# Patient Record
Sex: MALE | Race: WHITE | Employment: OTHER | URBAN - METROPOLITAN AREA
[De-identification: names, ages, dates, MRNs, and addresses within clinical notes are randomized per-mention and may not be internally consistent; named-entity substitution may affect disease eponyms.]

---

## 2019-01-01 ENCOUNTER — OFFICE VISIT (OUTPATIENT)
Dept: CARDIOLOGY CLINIC | Age: 64
End: 2019-01-01

## 2019-01-01 VITALS
HEART RATE: 67 BPM | BODY MASS INDEX: 30.61 KG/M2 | HEIGHT: 67 IN | DIASTOLIC BLOOD PRESSURE: 74 MMHG | WEIGHT: 195 LBS | SYSTOLIC BLOOD PRESSURE: 130 MMHG

## 2019-01-01 VITALS
BODY MASS INDEX: 30.45 KG/M2 | DIASTOLIC BLOOD PRESSURE: 93 MMHG | SYSTOLIC BLOOD PRESSURE: 176 MMHG | WEIGHT: 194 LBS | HEART RATE: 66 BPM | HEIGHT: 67 IN

## 2019-01-01 DIAGNOSIS — Z95.5 HISTORY OF CORONARY ARTERY STENT PLACEMENT: ICD-10-CM

## 2019-01-01 DIAGNOSIS — I25.118 CORONARY ARTERY DISEASE OF NATIVE ARTERY OF NATIVE HEART WITH STABLE ANGINA PECTORIS (HCC): Primary | ICD-10-CM

## 2019-01-01 DIAGNOSIS — R07.9 CHEST PAIN, UNSPECIFIED TYPE: Primary | ICD-10-CM

## 2019-01-01 DIAGNOSIS — I25.118 CORONARY ARTERY DISEASE OF NATIVE ARTERY OF NATIVE HEART WITH STABLE ANGINA PECTORIS (HCC): ICD-10-CM

## 2019-01-01 DIAGNOSIS — E78.00 HYPERCHOLESTEREMIA: ICD-10-CM

## 2019-01-01 DIAGNOSIS — I10 ESSENTIAL HYPERTENSION: ICD-10-CM

## 2019-01-01 DIAGNOSIS — R07.9 CHEST PAIN, UNSPECIFIED TYPE: ICD-10-CM

## 2019-01-01 LAB — LDL-C, EXTERNAL: 93

## 2019-01-01 RX ORDER — ATORVASTATIN CALCIUM 80 MG/1
80 TABLET, FILM COATED ORAL DAILY
Qty: 90 TAB | Refills: 1 | Status: SHIPPED | OUTPATIENT
Start: 2019-01-01 | End: 2019-01-01 | Stop reason: SDUPTHER

## 2019-01-01 RX ORDER — METOPROLOL SUCCINATE 100 MG/1
100 TABLET, EXTENDED RELEASE ORAL DAILY
Qty: 90 TAB | Refills: 3 | Status: SHIPPED | OUTPATIENT
Start: 2019-01-01

## 2019-01-01 RX ORDER — AMLODIPINE BESYLATE 5 MG/1
5 TABLET ORAL DAILY
Qty: 30 TAB | Refills: 3 | Status: SHIPPED | OUTPATIENT
Start: 2019-01-01 | End: 2019-01-01 | Stop reason: SDUPTHER

## 2019-01-01 RX ORDER — ATORVASTATIN CALCIUM 80 MG/1
80 TABLET, FILM COATED ORAL DAILY
Qty: 90 TAB | Refills: 3 | Status: SHIPPED | OUTPATIENT
Start: 2019-01-01

## 2019-01-01 RX ORDER — METOPROLOL SUCCINATE 100 MG/1
100 TABLET, EXTENDED RELEASE ORAL DAILY
COMMUNITY
End: 2019-01-01 | Stop reason: SDUPTHER

## 2019-01-01 RX ORDER — NITROGLYCERIN 0.4 MG/1
0.4 TABLET SUBLINGUAL
Qty: 25 TAB | Refills: 1 | Status: SHIPPED | OUTPATIENT
Start: 2019-01-01

## 2019-01-01 RX ORDER — LEVOTHYROXINE SODIUM 150 UG/1
150 TABLET ORAL
Qty: 90 TAB | Refills: 3 | OUTPATIENT
Start: 2019-01-01

## 2019-01-01 RX ORDER — AMLODIPINE BESYLATE 5 MG/1
5 TABLET ORAL DAILY
Qty: 90 TAB | Refills: 3 | Status: SHIPPED | OUTPATIENT
Start: 2019-01-01

## 2019-03-08 PROBLEM — I10 ESSENTIAL HYPERTENSION: Status: ACTIVE | Noted: 2019-01-01

## 2019-03-08 NOTE — PROGRESS NOTES
HISTORY OF PRESENT ILLNESS  Iona Escobar is a 75 y.o. male. 4/15 Patient denies significant chest pain, SOB, palpitations, edema, dizziness        Cholesterol Problem   The history is provided by the medical records. This is a chronic problem. Associated symptoms include chest pain and shortness of breath. Pertinent negatives include no headaches. Chest Pain (Angina)    The history is provided by the patient. This is a new problem. The current episode started more than 1 week ago (11/18). The pain is associated with exertion (hunting). The pain is present in the substernal region. The quality of the pain is described as pressure-like. The pain does not radiate. Associated symptoms include shortness of breath. Pertinent negatives include no claudication, no cough, no diaphoresis, no dizziness, no fever, no headaches, no malaise/fatigue, no nausea, no orthopnea, no palpitations, no PND and no vomiting. Review of Systems   Constitutional: Negative for chills, diaphoresis, fever, malaise/fatigue and weight loss. HENT: Negative for nosebleeds. Eyes: Negative for discharge. Respiratory: Positive for shortness of breath. Negative for cough and wheezing. Cardiovascular: Positive for chest pain. Negative for palpitations, orthopnea, claudication, leg swelling and PND. Gastrointestinal: Negative for diarrhea, nausea and vomiting. Genitourinary: Negative for dysuria and hematuria. Musculoskeletal: Negative for joint pain. Skin: Negative for rash. Neurological: Negative for dizziness, seizures, loss of consciousness and headaches. Endo/Heme/Allergies: Negative for polydipsia. Does not bruise/bleed easily. Psychiatric/Behavioral: Negative for depression and substance abuse. The patient does not have insomnia.       No Known Allergies    Past Medical History:   Diagnosis Date    CAD (coronary artery disease)     Coronary atherosclerosis of native coronary artery     Myocardial infarction ([facility] [address]     Other and unspecified angina pectoris     improved    Other and unspecified hyperlipidemia     Postsurgical percutaneous transluminal coronary angioplasty status     Thyroid disease        Family History   Problem Relation Age of Onset    Heart Disease Father         CAD    Coronary Artery Disease Father     Heart Attack Father 54    Heart Surgery Brother 62    Stroke Neg Hx        Social History     Tobacco Use    Smoking status: Never Smoker    Smokeless tobacco: Never Used    Tobacco comment: extensive exposure to second hand smoke   Substance Use Topics    Alcohol use: Yes     Alcohol/week: 2.4 - 3.0 oz     Types: 3 - 4 Standard drinks or equivalent, 1 Cans of beer per week     Comment: occasional     Drug use: No        Current Outpatient Medications   Medication Sig    metoprolol (LOPRESSOR) 100 mg tablet TAKE 1 TABLET BY MOUTH DAILY    atorvastatin (LIPITOR) 40 mg tablet TAKE ONE TABLET BY MOUTH EVERY DAY    levothyroxine (SYNTHROID) 150 mcg tablet Take  by mouth Daily (before breakfast).  DOCOSAHEXANOIC ACID/EPA (FISH OIL PO) Take  by mouth.  nitroglycerin (NITROSTAT) 0.4 mg SL tablet 1 Tab by SubLINGual route every five (5) minutes as needed for Chest Pain.  aspirin 81 mg tablet Take 81 mg by mouth daily.  clopidogrel (PLAVIX) 75 mg tablet Take 1 Tab by mouth daily.  fenofibrate nanocrystallized (TRICOR) 145 mg tablet Take 1 Tab by mouth daily. No current facility-administered medications for this visit.          Past Surgical History:   Procedure Laterality Date    HX ADENOIDECTOMY      HX CORONARY STENT PLACEMENT      HX HEART CATHETERIZATION      HX TONSILLECTOMY         Diagnostic Studies:  CARDIOLOGY STUDIES 8/8/2014 6/19/2014 4/7/2014   Cardiac Cath with PCI Result - LINO prox RCA -   Treadmill Stress Test Result WNL - -   Pharmacological Nuclear Stress Test Result - - nl scan, nl EF   Echocardiogram - Complete Result - 55-60%EF, tr MR -   Some recent data might be hidden       Visit Vitals  BP (!) 176/93 (BP 1 Location: Right arm, BP Patient Position: Sitting)   Pulse 66   Ht 5' 7\" (1.702 m)   Wt 88 kg (194 lb)   BMI 30.38 kg/m²       Mr. Gill Raman has a reminder for a \"due or due soon\" health maintenance. I have asked that he contact his primary care provider for follow-up on this health maintenance. Physical Exam   Constitutional: He is oriented to person, place, and time. He appears well-developed and well-nourished. No distress. HENT:   Head: Normocephalic and atraumatic. Mouth/Throat: Normal dentition. Eyes: Right eye exhibits no discharge. Left eye exhibits no discharge. No scleral icterus. Neck: Neck supple. No JVD present. Carotid bruit is not present. No thyromegaly present. Cardiovascular: Normal rate, regular rhythm, S1 normal, S2 normal, normal heart sounds and intact distal pulses. Exam reveals no gallop and no friction rub. No murmur heard. Pulmonary/Chest: Effort normal and breath sounds normal. He has no wheezes. He has no rales. Abdominal: Soft. He exhibits no mass. There is no tenderness. Musculoskeletal: He exhibits no edema. Lymphadenopathy:        Right cervical: No superficial cervical adenopathy present. Left cervical: No superficial cervical adenopathy present. Neurological: He is alert and oriented to person, place, and time. Skin: Skin is warm and dry. No rash noted. Psychiatric: He has a normal mood and affect. His behavior is normal.       ASSESSMENT and PLAN      HLD: 1/19 , LDL 93, HDL 36,     History of coronary stent: 6/14 proximal RCA        Diagnoses and all orders for this visit:    1. Coronary artery disease of native artery of native heart with stable angina pectoris (HCC)  -     amLODIPine (NORVASC) 5 mg tablet; Take 1 Tab by mouth daily.   -     nitroglycerin (NITROSTAT) 0.4 mg SL tablet; 1 Tab by SubLINGual route every five (5) minutes as needed for Chest Pain for up to 3 doses.  -     NUCLEAR CARDIAC STRESS TEST; Future    2. Chest pain, unspecified type  -     AMB POC EKG ROUTINE W/ 12 LEADS, INTER & REP  -     NUCLEAR CARDIAC STRESS TEST; Future    3. Hypercholesteremia  -     atorvastatin (LIPITOR) 80 mg tablet; Take 1 Tab by mouth daily. 4. Essential hypertension    5. History of coronary artery stent placement  -     NUCLEAR CARDIAC STRESS TEST; Future        Pertinent laboratory and test data reviewed and discussed with patient.   See patient instructions also for other medical advice given    Medications Discontinued During This Encounter   Medication Reason    fenofibrate nanocrystallized (TRICOR) 145 mg tablet     clopidogrel (PLAVIX) 75 mg tablet     metoprolol (LOPRESSOR) 100 mg tablet Alternate Therapy    atorvastatin (LIPITOR) 40 mg tablet     nitroglycerin (NITROSTAT) 0.4 mg SL tablet Reorder       Follow-up Disposition:  Return in about 2 weeks (around 3/22/2019), or if symptoms worsen or fail to improve, for same day post test.

## 2019-03-08 NOTE — PATIENT INSTRUCTIONS
Medications Discontinued During This Encounter   Medication Reason    fenofibrate nanocrystallized (TRICOR) 145 mg tablet     clopidogrel (PLAVIX) 75 mg tablet     metoprolol (LOPRESSOR) 100 mg tablet Alternate Therapy    atorvastatin (LIPITOR) 40 mg tablet     nitroglycerin (NITROSTAT) 0.4 mg SL tablet Reorder   After the recommended changes have been made in blood pressure medicines, patient advised to keep BP/HR(pulse rate) chart twice daily and bring us results in next office visit. Patient may send the results via \"My Chart\" if desired. Please rest for 5-10 minutes before checking blood pressure         Learning About Coronary Artery Disease (CAD)  What is coronary artery disease? Coronary artery disease (CAD) occurs when plaque builds up in the arteries that bring oxygen-rich blood to your heart. Plaque is a fatty substance made of cholesterol, calcium, and other substances in the blood. This process is called hardening of the arteries, or atherosclerosis. What happens when you have coronary artery disease? · Plaque may narrow the coronary arteries. Narrowed arteries cause poor blood flow. This can lead to angina symptoms such as chest pain or discomfort. If blood flow is completely blocked, you could have a heart attack. · You can slow CAD and reduce the risk of future problems by making changes in your lifestyle. These include quitting smoking and eating heart-healthy foods. · Treatments for CAD, along with changes in your lifestyle, can help you live a longer and healthier life. How can you prevent coronary artery disease? · Do not smoke. It may be the best thing you can do to prevent heart disease. If you need help quitting, talk to your doctor about stop-smoking programs and medicines. These can increase your chances of quitting for good. · Be active. Get at least 30 minutes of exercise on most days of the week. Walking is a good choice.  You also may want to do other activities, such as running, swimming, cycling, or playing tennis or team sports. · Eat heart-healthy foods. Eat more fruits and vegetables and less foods that contain saturated and trans fats. Limit alcohol, sodium, and sweets. · Stay at a healthy weight. Lose weight if you need to. · Manage other health problems such as diabetes, high blood pressure, and high cholesterol. · Manage stress. Stress can hurt your heart. To keep stress low, talk about your problems and feelings. Don't keep your feelings hidden. · If you have talked about it with your doctor, take a low-dose aspirin every day. Aspirin can help certain people lower their risk of a heart attack or stroke. But taking aspirin isn't right for everyone, because it can cause serious bleeding. Do not start taking daily aspirin unless your doctor knows about it. How is coronary artery disease treated? · Your doctor will suggest that you make lifestyle changes. For example, your doctor may ask you to eat healthy foods, quit smoking, lose extra weight, and be more active. · You will have to take medicines. · Your doctor may suggest a procedure to open narrowed or blocked arteries. This is called angioplasty. Or your doctor may suggest using healthy blood vessels to create detours around narrowed or blocked arteries. This is called bypass surgery. Follow-up care is a key part of your treatment and safety. Be sure to make and go to all appointments, and call your doctor if you are having problems. It's also a good idea to know your test results and keep a list of the medicines you take. Where can you learn more? Go to http://luca-randy.info/. Enter [phone] in the search box to learn more about \"Learning About Coronary Artery Disease (CAD). \"  Current as of: July 22, 2018  Content Version: 11.9  © 7138-7122 [employer], [employer].  Care instructions adapted under license by [employer] (which disclaims liability or warranty for this information). If you have questions about a medical condition or this instruction, always ask your healthcare professional. [facility] any warranty or liability for your use of this information. "[employer] Activation    Thank you for requesting access to "[employer]. Please follow the instructions below to securely access and download your online medical record. "[employer] allows you to send messages to your doctor, view your test results, renew your prescriptions, schedule appointments, and more. How Do I Sign Up? 1. In your internet browser, go to [email or web address]. 2. Click on the First Time User? Click Here link in the Sign In box. You will see the New Member Sign Up page. 3. Enter your "[employer] Access Code exactly as it appears below. You will not need to use this code after youve completed the sign-up process. If you do not sign up before the expiration date, you must request a new code. "[employer] Access Code: [email or web address]  Expires: 2019  1:08 PM (This is the date your "[employer] access code will )    4. Enter the last four digits of your Social Security Number (xxxx) and Date of Birth (mm/dd/yyyy) as indicated and click Submit. You will be taken to the next sign-up page. 5. Create a "[employer] ID. This will be your "[employer] login ID and cannot be changed, so think of one that is secure and easy to remember. 6. Create a "[employer] password. You can change your password at any time. 7. Enter your Password Reset Question and Answer. This can be used at a later time if you forget your password. 8. Enter your e-mail address. You will receive e-mail notification when new information is available in [address] 9. Click Sign Up. You can now view and download portions of your medical record. 10. Click the Download Summary menu link to download a portable copy of your medical information.     Additional Information    If you have questions, please visit the Frequently Asked Questions section of the [employer] website at [email or web address] Remember, [employer] is NOT to be used for urgent needs. For medical emergencies, dial 911.

## 2019-03-08 NOTE — PROGRESS NOTES
1. Have you been to the ER, urgent care clinic since your last visit? Hospitalized since your last visit? No    2. Have you seen or consulted any other health care providers outside of the [address] [place] [address] since your last visit? Include any pap smears or colon screening. PCP      3. Since your last visit, have you had any of the following symptoms? chest pains, palpitations and shortness of breath with exertion. 4.  Have you had any blood work, X-rays or cardiac testing? Blood work faxed from PCP    5. Where do you normally have your labs drawn? PCP/ [facility]    6. Do you need any refills today?    No

## 2019-03-08 NOTE — LETTER
Ernestine Arias 1955 
 
3/8/2019 Dear Thuan Hollis MD 
 
I had the pleasure of evaluating  Mr. Adwoa Stewart in office today. Below are the relevant portions of my assessment and plan of care. ICD-10-CM ICD-9-CM 1. Coronary artery disease of native artery of native heart with stable angina pectoris (HCC) I25.118 414.01 metoprolol succinate (TOPROL-XL) 100 mg tablet 413.9 amLODIPine (NORVASC) 5 mg tablet  
   nitroglycerin (NITROSTAT) 0.4 mg SL tablet NUCLEAR CARDIAC STRESS TEST 2. Chest pain, unspecified type R07.9 786.50 AMB POC EKG ROUTINE W/ 12 LEADS, INTER & REP  
   NUCLEAR CARDIAC STRESS TEST 3. Hypercholesteremia E78.00 272.0 atorvastatin (LIPITOR) 80 mg tablet 4. Essential hypertension I10 401.9 5. History of coronary artery stent placement Z95.5 V45.82 NUCLEAR CARDIAC STRESS TEST Current Outpatient Medications Medication Sig Dispense Refill  metoprolol succinate (TOPROL-XL) 100 mg tablet Take 100 mg by mouth daily.  amLODIPine (NORVASC) 5 mg tablet Take 1 Tab by mouth daily. 30 Tab 3  
 atorvastatin (LIPITOR) 80 mg tablet Take 1 Tab by mouth daily. 90 Tab 1  
 nitroglycerin (NITROSTAT) 0.4 mg SL tablet 1 Tab by SubLINGual route every five (5) minutes as needed for Chest Pain for up to 3 doses. 25 Tab 1  
 levothyroxine (SYNTHROID) 150 mcg tablet Take  by mouth Daily (before breakfast).  DOCOSAHEXANOIC ACID/EPA (FISH OIL PO) Take  by mouth.  aspirin 81 mg tablet Take 81 mg by mouth daily. Orders Placed This Encounter  AMB POC EKG ROUTINE W/ 12 LEADS, INTER & REP Order Specific Question:   Reason for Exam: Answer:   follow up  metoprolol succinate (TOPROL-XL) 100 mg tablet Sig: Take 100 mg by mouth daily.  amLODIPine (NORVASC) 5 mg tablet Sig: Take 1 Tab by mouth daily. Dispense:  30 Tab Refill:  3  
 atorvastatin (LIPITOR) 80 mg tablet Sig: Take 1 Tab by mouth daily. Dispense:  90 Tab Refill:  1  
 nitroglycerin (NITROSTAT) 0.4 mg SL tablet Si Tab by SubLINGual route every five (5) minutes as needed for Chest Pain for up to 3 doses. Dispense:  25 Tab Refill:  1 If you have questions, please do not hesitate to call me. I look forward to following Mr. Lieutenant Razo along with you. Sincerely, Donte Zhang MD

## 2019-04-15 PROBLEM — Z95.5 HISTORY OF CORONARY ARTERY STENT PLACEMENT: Status: ACTIVE | Noted: 2019-01-01

## 2019-04-15 NOTE — PROGRESS NOTES
HISTORY OF PRESENT ILLNESS  [place] is a 75 y.o. male. 4/15 Patient denies significant chest pain, SOB, palpitations, edema, dizziness    Found to have mild asbestosis by a pulmonary specialist in 2016/17 approximately. Cholesterol Problem   The history is provided by the medical records. This is a chronic problem. Associated symptoms include chest pain and shortness of breath. Pertinent negatives include no headaches. Chest Pain (Angina)    The history is provided by the patient. This is a new problem. The current episode started more than 1 week ago (11/18). The problem has been rapidly improving (But has not done any heavy activity since last visit). The pain is associated with exertion (hunting). The pain is present in the substernal region. The quality of the pain is described as pressure-like. The pain does not radiate. Associated symptoms include shortness of breath. Pertinent negatives include no claudication, no cough, no diaphoresis, no dizziness, no fever, no headaches, no malaise/fatigue, no nausea, no orthopnea, no palpitations, no PND and no vomiting. Hypertension   The history is provided by the medical records. This is a chronic problem. Associated symptoms include chest pain and shortness of breath. Pertinent negatives include no headaches. Review of Systems   Constitutional: Negative for chills, diaphoresis, fever, malaise/fatigue and weight loss. HENT: Negative for nosebleeds. Eyes: Negative for discharge. Respiratory: Positive for shortness of breath. Negative for cough and wheezing. Cardiovascular: Positive for chest pain. Negative for palpitations, orthopnea, claudication, leg swelling and PND. Gastrointestinal: Negative for diarrhea, nausea and vomiting. Genitourinary: Negative for dysuria and hematuria. Musculoskeletal: Negative for joint pain. Skin: Negative for rash.    Neurological: Negative for dizziness, seizures, loss of consciousness and headaches. Endo/Heme/Allergies: Negative for polydipsia. Does not bruise/bleed easily. Psychiatric/Behavioral: Negative for depression and substance abuse. The patient does not have insomnia. No Known Allergies    Past Medical History:   Diagnosis Date    CAD (coronary artery disease)     Coronary atherosclerosis of native coronary artery     Essential hypertension 3/8/2019    Myocardial infarction ([facility] [address]     Other and unspecified angina pectoris     improved    Other and unspecified hyperlipidemia     Postsurgical percutaneous transluminal coronary angioplasty status     Thyroid disease        Family History   Problem Relation Age of Onset    Heart Disease Father         CAD    Coronary Artery Disease Father     Heart Attack Father 54    Heart Surgery Brother 62    Stroke Neg Hx        Social History     Tobacco Use    Smoking status: Never Smoker    Smokeless tobacco: Never Used    Tobacco comment: extensive exposure to second hand smoke   Substance Use Topics    Alcohol use: Yes     Alcohol/week: 2.4 - 3.0 oz     Types: 3 - 4 Standard drinks or equivalent, 1 Cans of beer per week     Comment: occasional     Drug use: No        Current Outpatient Medications   Medication Sig    metoprolol succinate (TOPROL-XL) 100 mg tablet Take 100 mg by mouth daily.  amLODIPine (NORVASC) 5 mg tablet Take 1 Tab by mouth daily.  atorvastatin (LIPITOR) 80 mg tablet Take 1 Tab by mouth daily.  nitroglycerin (NITROSTAT) 0.4 mg SL tablet 1 Tab by SubLINGual route every five (5) minutes as needed for Chest Pain for up to 3 doses.  levothyroxine (SYNTHROID) 150 mcg tablet Take  by mouth Daily (before breakfast).  DOCOSAHEXANOIC ACID/EPA (FISH OIL PO) Take  by mouth.  aspirin 81 mg tablet Take 81 mg by mouth daily. No current facility-administered medications for this visit.          Past Surgical History:   Procedure Laterality Date    HX ADENOIDECTOMY      HX CORONARY STENT PLACEMENT  HX HEART CATHETERIZATION      HX TONSILLECTOMY         Diagnostic Studies:  CARDIOLOGY STUDIES 8/8/2014 6/19/2014 4/7/2014   Cardiac Cath with PCI Result - LINO prox RCA -   Treadmill Stress Test Result WNL - -   Pharmacological Nuclear Stress Test Result - - nl scan, nl EF   Echocardiogram - Complete Result - 55-60%EF, tr MR -   Some recent data might be hidden   4/19  Nuclear Stress Test     Negative myocardial perfusion imaging. Myocardial perfusion imaging supports a low risk stress test.   There is a prior study available for comparison. As compared to the previous study, there are no significant changes. Interpretation Summary     · Gated SPECT: Left ventricular function post-stress was normal. Calculated ejection fraction is 73%. There is no evidence of transient ischemic dilation (TID). The TID ratio is 1.03.  · Baseline ECG: Normal sinus rhythm, myocardial infarction. The infarction is located in the anteroseptal regions. .  · Negative stress test.  · Left ventricular perfusion is normal.  · Negative myocardial perfusion imaging. Myocardial perfusion imaging supports a low risk stress test.            Visit Vitals  /74   Pulse 67   Ht 5' 7\" (1.702 m)   Wt 88.5 kg (195 lb)   BMI 30.54 kg/m²       Mr. Sarah Keith has a reminder for a \"due or due soon\" health maintenance. I have asked that he contact his primary care provider for follow-up on this health maintenance. Physical Exam   Constitutional: He is oriented to person, place, and time. He appears well-developed and well-nourished. No distress. HENT:   Head: Normocephalic and atraumatic. Mouth/Throat: Normal dentition. Eyes: Right eye exhibits no discharge. Left eye exhibits no discharge. No scleral icterus. Neck: Neck supple. No JVD present. Carotid bruit is not present. No thyromegaly present. Cardiovascular: Normal rate, regular rhythm, S1 normal, S2 normal, normal heart sounds and intact distal pulses.  Exam reveals no gallop and no friction rub. No murmur heard. Pulmonary/Chest: Effort normal and breath sounds normal. He has no wheezes. He has no rales. Abdominal: Soft. He exhibits no mass. There is no tenderness. Musculoskeletal: He exhibits no edema. Lymphadenopathy:        Right cervical: No superficial cervical adenopathy present. Left cervical: No superficial cervical adenopathy present. Neurological: He is alert and oriented to person, place, and time. Skin: Skin is warm and dry. No rash noted. Psychiatric: He has a normal mood and affect. His behavior is normal.       ASSESSMENT and PLAN      HLD: 1/19 , LDL 93, HDL 36,     History of coronary stent: 6/14 proximal RCA    Chest pain has not happened since last time but patient has not done much heavy work. Stress test is normal today. Recommended to start exercising ad mary beth. and stay active. Diet and active lifestyle discussed. Diagnoses and all orders for this visit:    1. Chest pain, unspecified type    2. Coronary artery disease of native artery of native heart with stable angina pectoris ([address]    3. History of coronary artery stent placement    4. Hypercholesteremia    5. Essential hypertension        Pertinent laboratory and test data reviewed and discussed with patient. See patient instructions also for other medical advice given    There are no discontinued medications. Follow-up and Dispositions    · Return in about 1 year (around 4/15/2020), or if symptoms worsen or fail to improve, for with ekg.

## 2019-04-15 NOTE — PROGRESS NOTES
1. Have you been to the ER, urgent care clinic since your last visit? Hospitalized since your last visit?     no  2. Have you seen or consulted any other health care providers outside of the [address] [place] [address] since your last visit? Include any pap smears or colon screening. No     3. Since your last visit, have you had any of the following symptoms? no         4. Have you had any blood work, X-rays or cardiac testing? No         5. Where do you normally have your labs drawn? 6. Do you need any refills today?    no

## 2019-04-15 NOTE — PATIENT INSTRUCTIONS
There are no discontinued medications. A Healthy Heart: Care Instructions  Your Care Instructions    Heart disease occurs when a substance called plaque builds up in the vessels that supply oxygen-rich blood to your heart. This can narrow the blood vessels and reduce blood flow. A heart attack happens when blood flow is completely blocked. A high-fat diet, smoking, and other factors increase the risk of heart disease. Your doctor has found that you have a chance of having heart disease. You can do lots of things to keep your heart healthy. It may not be easy, but you can change your diet, exercise more, and quit smoking. These steps really work to lower your chance of heart disease. Follow-up care is a key part of your treatment and safety. Be sure to make and go to all appointments, and call your doctor if you are having problems. It's also a good idea to know your test results and keep a list of the medicines you take. How can you care for yourself at home? Diet    · Use less salt when you cook and eat. This helps lower your blood pressure. Taste food before salting. Add only a little salt when you think you need it. With time, your taste buds will adjust to less salt.     · Eat fewer snack items, fast foods, canned soups, and other high-salt, high-fat, processed foods.     · Read food labels and try to avoid saturated and trans fats. They increase your risk of heart disease by raising cholesterol levels.     · Limit the amount of solid fat-butter, margarine, and shortening-you eat. Use olive, peanut, or canola oil when you cook. Bake, broil, and steam foods instead of frying them.     · Eating fish can lower your risk for heart disease. Eat at least 2 servings of fish a week. [place], mackerel, herring, sardines, and chunk light tuna are very good choices. These fish contain omega-3 fatty acids.     · Eat a variety of fruit and vegetables every day.  Dark green, deep orange, red, or yellow fruits and vegetables are especially good for you. Examples include spinach, carrots, peaches, and berries.     · Foods high in fiber can reduce your cholesterol and provide important vitamins and minerals. High-fiber foods include whole-grain cereals and breads, oatmeal, beans, brown rice, citrus fruits, and apples.     · Limit drinks and foods with added sugar. These include candy, desserts, and soda pop.    Lifestyle changes    · If your doctor recommends it, get more exercise. Walking is a good choice. Bit by bit, increase the amount you walk every day. Try for at least 30 minutes on most days of the week. You also may want to swim, bike, or do other activities.     · Do not smoke. If you need help quitting, talk to your doctor about stop-smoking programs and medicines. These can increase your chances of quitting for good. Quitting smoking may be the most important step you can take to protect your heart. It is never too late to quit. You will get health benefits right away.     · Limit alcohol to 2 drinks a day for men and 1 drink a day for women. Too much alcohol can cause health problems. Medicines    · Take your medicines exactly as prescribed. Call your doctor if you think you are having a problem with your medicine.     · If your doctor recommends aspirin, take the amount directed each day. Make sure you take aspirin and not another kind of pain reliever, such as acetaminophen (Tylenol). If you take ibuprofen (such as Advil or Motrin) for other problems, take aspirin at least 2 hours before taking ibuprofen. When should you call for help? Call 911 if you have symptoms of a heart attack. These may include:    · Chest pain or pressure, or a strange feeling in the chest.     · Sweating.     · Shortness of breath.     · Pain, pressure, or a strange feeling in the back, neck, jaw, or upper belly or in one or both shoulders or arms.     · Lightheadedness or sudden weakness.     · A fast or irregular heartbeat.  After you call 911, the  may tell you to chew 1 adult-strength or 2 to 4 low-dose aspirin. Wait for an ambulance. Do not try to drive yourself.   Watch closely for changes in your health, and be sure to contact your doctor if you have any problems. Where can you learn more? Go to http://luca-randy.info/. Enter F136 in the search box to learn more about \"A Healthy Heart: Care Instructions. \"  Current as of: 2018  Content Version: 11.9  © 4995-7195 [employer]. Care instructions adapted under license by [employer] (which disclaims liability or warranty for this information). If you have questions about a medical condition or this instruction, always ask your healthcare professional. [address] any warranty or liability for your use of this information. [employer] Activation    Thank you for requesting access to [employer]. Please follow the instructions below to securely access and download your online medical record. [employer] allows you to send messages to your doctor, view your test results, renew your prescriptions, schedule appointments, and more. How Do I Sign Up? 1. In your internet browser, go to [email or web address]. 2. Click on the First Time User? Click Here link in the Sign In box. You will see the New Member Sign Up page. 3. Enter your [employer] Access Code exactly as it appears below. You will not need to use this code after youve completed the sign-up process. If you do not sign up before the expiration date, you must request a new code. [employer] Access Code: [email or web address]  Expires: 2019  2:08 PM (This is the date your [employer] access code will )    4. Enter the last four digits of your Social Security Number (xxxx) and Date of Birth (mm/dd/yyyy) as indicated and click Submit. You will be taken to the next sign-up page. 5. Create a [employer] ID.  This will be your [employer] login ID and cannot be changed, so think of one that is secure and easy to remember. 6. Create a [employer] password. You can change your password at any time. 7. Enter your Password Reset Question and Answer. This can be used at a later time if you forget your password. 8. Enter your e-mail address. You will receive e-mail notification when new information is available in [address] 9. Click Sign Up. You can now view and download portions of your medical record. 10. Click the Download Summary menu link to download a portable copy of your medical information. Additional Information    If you have questions, please visit the Frequently Asked Questions section of the [employer] website at [email or web address] Remember, [employer] is NOT to be used for urgent needs. For medical emergencies, dial 911.

## 2020-01-01 ENCOUNTER — HOSPITAL ENCOUNTER (INPATIENT)
Age: 65
LOS: 1 days | DRG: 270 | End: 2020-01-05
Attending: EMERGENCY MEDICINE | Admitting: INTERNAL MEDICINE
Payer: MEDICARE

## 2020-01-01 ENCOUNTER — APPOINTMENT (OUTPATIENT)
Dept: GENERAL RADIOLOGY | Age: 65
DRG: 270 | End: 2020-01-01
Attending: EMERGENCY MEDICINE
Payer: MEDICARE

## 2020-01-01 VITALS
RESPIRATION RATE: 16 BRPM | OXYGEN SATURATION: 100 % | BODY MASS INDEX: 30.54 KG/M2 | HEART RATE: 95 BPM | SYSTOLIC BLOOD PRESSURE: 103 MMHG | DIASTOLIC BLOOD PRESSURE: 54 MMHG | WEIGHT: 195 LBS

## 2020-01-01 DIAGNOSIS — Z99.11 VENTILATOR DEPENDENT (HCC): ICD-10-CM

## 2020-01-01 DIAGNOSIS — I21.09 ACUTE MI, ANTERIOR WALL (HCC): Primary | ICD-10-CM

## 2020-01-01 DIAGNOSIS — I46.9 CARDIAC ARREST (HCC): ICD-10-CM

## 2020-01-01 DIAGNOSIS — I49.01 VENTRICULAR FIBRILLATION (HCC): ICD-10-CM

## 2020-01-01 DIAGNOSIS — I21.3 ST ELEVATION (STEMI) MYOCARDIAL INFARCTION (HCC): ICD-10-CM

## 2020-01-01 LAB
ALBUMIN SERPL-MCNC: 3.5 G/DL (ref 3.4–5)
ALBUMIN/GLOB SERPL: 1 {RATIO} (ref 0.8–1.7)
ALP SERPL-CCNC: 81 U/L (ref 45–117)
ALT SERPL-CCNC: 119 U/L (ref 16–61)
ANION GAP BLD CALC-SCNC: 17 MMOL/L (ref 10–20)
ANION GAP SERPL CALC-SCNC: 6 MMOL/L (ref 3–18)
APTT PPP: 28.6 SEC (ref 23–36.4)
ARTERIAL PATENCY WRIST A: ABNORMAL
ARTERIAL PATENCY WRIST A: ABNORMAL
ARTERIAL PATENCY WRIST A: YES
ARTERIAL PATENCY WRIST A: YES
AST SERPL-CCNC: 115 U/L (ref 10–38)
BASE DEFICIT BLD-SCNC: 15 MMOL/L
BASE DEFICIT BLD-SCNC: 5 MMOL/L
BASE DEFICIT BLDV-SCNC: 6 MMOL/L
BASE DEFICIT BLDV-SCNC: 9 MMOL/L
BASOPHILS # BLD: 0 K/UL (ref 0–0.1)
BASOPHILS NFR BLD: 0 % (ref 0–2)
BDY SITE: ABNORMAL
BILIRUB SERPL-MCNC: 0.6 MG/DL (ref 0.2–1)
BUN BLD-MCNC: 18 MG/DL (ref 7–18)
BUN SERPL-MCNC: 19 MG/DL (ref 7–18)
BUN/CREAT SERPL: 16 (ref 12–20)
CA-I BLD-MCNC: 1.2 MMOL/L (ref 1.12–1.32)
CALCIUM SERPL-MCNC: 8.7 MG/DL (ref 8.5–10.1)
CHLORIDE BLD-SCNC: 107 MMOL/L (ref 100–108)
CHLORIDE SERPL-SCNC: 112 MMOL/L (ref 100–111)
CK MB CFR SERPL CALC: 6.7 % (ref 0–4)
CK MB SERPL-MCNC: 13.7 NG/ML (ref 5–25)
CK SERPL-CCNC: 204 U/L (ref 39–308)
CO2 BLD-SCNC: 24 MMOL/L (ref 19–24)
CO2 SERPL-SCNC: 27 MMOL/L (ref 21–32)
CREAT SERPL-MCNC: 1.2 MG/DL (ref 0.6–1.3)
CREAT UR-MCNC: 1.1 MG/DL (ref 0.6–1.3)
DIFFERENTIAL METHOD BLD: ABNORMAL
EOSINOPHIL # BLD: 0.3 K/UL (ref 0–0.4)
EOSINOPHIL NFR BLD: 2 % (ref 0–5)
ERYTHROCYTE [DISTWIDTH] IN BLOOD BY AUTOMATED COUNT: 13 % (ref 11.6–14.5)
GAS FLOW.O2 O2 DELIVERY SYS: ABNORMAL L/MIN
GAS FLOW.O2 SETTING OXYMISER: 10 L/M
GAS FLOW.O2 SETTING OXYMISER: 15 L/M
GAS FLOW.O2 SETTING OXYMISER: 16 BPM
GLOBULIN SER CALC-MCNC: 3.6 G/DL (ref 2–4)
GLUCOSE BLD STRIP.AUTO-MCNC: 108 MG/DL (ref 70–110)
GLUCOSE BLD STRIP.AUTO-MCNC: 159 MG/DL (ref 74–106)
GLUCOSE SERPL-MCNC: 143 MG/DL (ref 74–99)
HCO3 BLD-SCNC: 11.7 MMOL/L (ref 22–26)
HCO3 BLD-SCNC: 21.2 MMOL/L (ref 22–26)
HCO3 BLDV-SCNC: 17.2 MMOL/L (ref 23–28)
HCO3 BLDV-SCNC: 23.8 MMOL/L (ref 23–28)
HCT VFR BLD AUTO: 46.1 % (ref 36–48)
HCT VFR BLD CALC: 46 % (ref 36–49)
HGB BLD-MCNC: 15.6 G/DL (ref 12–16)
HGB BLD-MCNC: 16.1 G/DL (ref 13–16)
INR PPP: 1.1 (ref 0.8–1.2)
INSPIRATION.DURATION SETTING TIME VENT: 1 SEC
LYMPHOCYTES # BLD: 13.6 K/UL (ref 0.9–3.6)
LYMPHOCYTES NFR BLD: 72 % (ref 21–52)
MCH RBC QN AUTO: 31 PG (ref 24–34)
MCHC RBC AUTO-ENTMCNC: 34.9 G/DL (ref 31–37)
MCV RBC AUTO: 88.8 FL (ref 74–97)
MONOCYTES # BLD: 1.2 K/UL (ref 0.05–1.2)
MONOCYTES NFR BLD: 6 % (ref 3–10)
NEUTS SEG # BLD: 3.8 K/UL (ref 1.8–8)
NEUTS SEG NFR BLD: 20 % (ref 40–73)
O2/TOTAL GAS SETTING VFR VENT: 100 %
PCO2 BLD: 30.4 MMHG (ref 35–45)
PCO2 BLD: 41.6 MMHG (ref 35–45)
PCO2 BLDV: 38.2 MMHG (ref 41–51)
PCO2 BLDV: 64.3 MMHG (ref 41–51)
PEEP RESPIRATORY: 8 CMH2O
PH BLD: 7.19 [PH] (ref 7.35–7.45)
PH BLD: 7.32 [PH] (ref 7.35–7.45)
PH BLDV: 7.18 [PH] (ref 7.32–7.42)
PH BLDV: 7.26 [PH] (ref 7.32–7.42)
PIP ISTAT,IPIP: 22
PLATELET # BLD AUTO: 188 K/UL (ref 135–420)
PMV BLD AUTO: 10.3 FL (ref 9.2–11.8)
PO2 BLD: 427 MMHG (ref 80–100)
PO2 BLD: 92 MMHG (ref 80–100)
PO2 BLDV: 26 MMHG (ref 25–40)
PO2 BLDV: 59 MMHG (ref 25–40)
POTASSIUM BLD-SCNC: 5 MMOL/L (ref 3.5–5.5)
POTASSIUM SERPL-SCNC: 4.4 MMOL/L (ref 3.5–5.5)
PROT SERPL-MCNC: 7.1 G/DL (ref 6.4–8.2)
PROTHROMBIN TIME: 14.2 SEC (ref 11.5–15.2)
RBC # BLD AUTO: 5.19 M/UL (ref 4.7–5.5)
SAO2 % BLD: 100 % (ref 92–97)
SAO2 % BLD: 95 % (ref 92–97)
SAO2 % BLDV: 32 % (ref 65–88)
SAO2 % BLDV: 86 % (ref 65–88)
SERVICE CMNT-IMP: ABNORMAL
SODIUM BLD-SCNC: 142 MMOL/L (ref 136–145)
SODIUM SERPL-SCNC: 145 MMOL/L (ref 136–145)
SPECIMEN TYPE: ABNORMAL
TOTAL RESP. RATE, ITRR: 16
TOTAL RESP. RATE, ITRR: 20
TROPONIN I BLD-MCNC: 0.55 NG/ML (ref 0–0.08)
TROPONIN I SERPL-MCNC: 1.2 NG/ML (ref 0–0.04)
TSH SERPL DL<=0.05 MIU/L-ACNC: 11.4 UIU/ML (ref 0.36–3.74)
VENTILATION MODE VENT: ABNORMAL
VOLUME CONTROL PLUS IVLCP: YES
VT SETTING VENT: 500 ML
WBC # BLD AUTO: 19 K/UL (ref 4.6–13.2)

## 2020-01-01 PROCEDURE — 80047 BASIC METABLC PNL IONIZED CA: CPT

## 2020-01-01 PROCEDURE — 0BH17EZ INSERTION OF ENDOTRACHEAL AIRWAY INTO TRACHEA, VIA NATURAL OR ARTIFICIAL OPENING: ICD-10-PCS | Performed by: EMERGENCY MEDICINE

## 2020-01-01 PROCEDURE — C1894 INTRO/SHEATH, NON-LASER: HCPCS | Performed by: INTERNAL MEDICINE

## 2020-01-01 PROCEDURE — 82962 GLUCOSE BLOOD TEST: CPT

## 2020-01-01 PROCEDURE — 74011000250 HC RX REV CODE- 250: Performed by: EMERGENCY MEDICINE

## 2020-01-01 PROCEDURE — 77030004558 HC CATH ANGI DX SUPR TORQ CARD -A: Performed by: INTERNAL MEDICINE

## 2020-01-01 PROCEDURE — 85730 THROMBOPLASTIN TIME PARTIAL: CPT

## 2020-01-01 PROCEDURE — 84443 ASSAY THYROID STIM HORMONE: CPT

## 2020-01-01 PROCEDURE — 74011636320 HC RX REV CODE- 636/320: Performed by: INTERNAL MEDICINE

## 2020-01-01 PROCEDURE — 74011000250 HC RX REV CODE- 250: Performed by: INTERNAL MEDICINE

## 2020-01-01 PROCEDURE — 94002 VENT MGMT INPAT INIT DAY: CPT

## 2020-01-01 PROCEDURE — 80053 COMPREHEN METABOLIC PANEL: CPT

## 2020-01-01 PROCEDURE — 82550 ASSAY OF CK (CPK): CPT

## 2020-01-01 PROCEDURE — 33967 INSERT I-AORT PERCUT DEVICE: CPT | Performed by: INTERNAL MEDICINE

## 2020-01-01 PROCEDURE — 75810000455 HC PLCMT CENT VENOUS CATH LVL 2 5182

## 2020-01-01 PROCEDURE — 77030016699 HC CATH ANGI DX INFN1 CARD -A: Performed by: INTERNAL MEDICINE

## 2020-01-01 PROCEDURE — 5A02210 ASSISTANCE WITH CARDIAC OUTPUT USING BALLOON PUMP, CONTINUOUS: ICD-10-PCS | Performed by: INTERNAL MEDICINE

## 2020-01-01 PROCEDURE — 36600 WITHDRAWAL OF ARTERIAL BLOOD: CPT

## 2020-01-01 PROCEDURE — 93005 ELECTROCARDIOGRAM TRACING: CPT

## 2020-01-01 PROCEDURE — 77030013797 HC KT TRNSDUC PRSSR EDWD -A: Performed by: INTERNAL MEDICINE

## 2020-01-01 PROCEDURE — 65270000029 HC RM PRIVATE

## 2020-01-01 PROCEDURE — 82803 BLOOD GASES ANY COMBINATION: CPT

## 2020-01-01 PROCEDURE — 74011000258 HC RX REV CODE- 258: Performed by: INTERNAL MEDICINE

## 2020-01-01 PROCEDURE — 74011250636 HC RX REV CODE- 250/636: Performed by: INTERNAL MEDICINE

## 2020-01-01 PROCEDURE — 77030020269 HC MISC IMPL: Performed by: INTERNAL MEDICINE

## 2020-01-01 PROCEDURE — 99291 CRITICAL CARE FIRST HOUR: CPT

## 2020-01-01 PROCEDURE — 31500 INSERT EMERGENCY AIRWAY: CPT

## 2020-01-01 PROCEDURE — C1769 GUIDE WIRE: HCPCS | Performed by: INTERNAL MEDICINE

## 2020-01-01 PROCEDURE — 85025 COMPLETE CBC W/AUTO DIFF WBC: CPT

## 2020-01-01 PROCEDURE — 92941 PRQ TRLML REVSC TOT OCCL AMI: CPT | Performed by: INTERNAL MEDICINE

## 2020-01-01 PROCEDURE — 027035Z DILATION OF CORONARY ARTERY, ONE ARTERY WITH TWO DRUG-ELUTING INTRALUMINAL DEVICES, PERCUTANEOUS APPROACH: ICD-10-PCS | Performed by: INTERNAL MEDICINE

## 2020-01-01 PROCEDURE — 74011250636 HC RX REV CODE- 250/636: Performed by: EMERGENCY MEDICINE

## 2020-01-01 PROCEDURE — 74011250636 HC RX REV CODE- 250/636

## 2020-01-01 PROCEDURE — 99153 MOD SED SAME PHYS/QHP EA: CPT | Performed by: INTERNAL MEDICINE

## 2020-01-01 PROCEDURE — C1725 CATH, TRANSLUMIN NON-LASER: HCPCS | Performed by: INTERNAL MEDICINE

## 2020-01-01 PROCEDURE — 84484 ASSAY OF TROPONIN QUANT: CPT

## 2020-01-01 PROCEDURE — B2111ZZ FLUOROSCOPY OF MULTIPLE CORONARY ARTERIES USING LOW OSMOLAR CONTRAST: ICD-10-PCS | Performed by: INTERNAL MEDICINE

## 2020-01-01 PROCEDURE — 96374 THER/PROPH/DIAG INJ IV PUSH: CPT

## 2020-01-01 PROCEDURE — 85610 PROTHROMBIN TIME: CPT

## 2020-01-01 PROCEDURE — C1874 STENT, COATED/COV W/DEL SYS: HCPCS | Performed by: INTERNAL MEDICINE

## 2020-01-01 PROCEDURE — 74011636637 HC RX REV CODE- 636/637: Performed by: INTERNAL MEDICINE

## 2020-01-01 PROCEDURE — 77030028790 HC ACC ST CTRL VLV COPLT ABBT -B: Performed by: INTERNAL MEDICINE

## 2020-01-01 PROCEDURE — 96375 TX/PRO/DX INJ NEW DRUG ADDON: CPT

## 2020-01-01 PROCEDURE — 77030013519 HC DEV INFL BASIX MRTM -B: Performed by: INTERNAL MEDICINE

## 2020-01-01 PROCEDURE — 5A1935Z RESPIRATORY VENTILATION, LESS THAN 24 CONSECUTIVE HOURS: ICD-10-PCS | Performed by: EMERGENCY MEDICINE

## 2020-01-01 PROCEDURE — B2151ZZ FLUOROSCOPY OF LEFT HEART USING LOW OSMOLAR CONTRAST: ICD-10-PCS | Performed by: INTERNAL MEDICINE

## 2020-01-01 PROCEDURE — 92950 HEART/LUNG RESUSCITATION CPR: CPT

## 2020-01-01 PROCEDURE — 4A023N7 MEASUREMENT OF CARDIAC SAMPLING AND PRESSURE, LEFT HEART, PERCUTANEOUS APPROACH: ICD-10-PCS | Performed by: INTERNAL MEDICINE

## 2020-01-01 PROCEDURE — 93458 L HRT ARTERY/VENTRICLE ANGIO: CPT | Performed by: INTERNAL MEDICINE

## 2020-01-01 PROCEDURE — C1887 CATHETER, GUIDING: HCPCS | Performed by: INTERNAL MEDICINE

## 2020-01-01 PROCEDURE — 02HV33Z INSERTION OF INFUSION DEVICE INTO SUPERIOR VENA CAVA, PERCUTANEOUS APPROACH: ICD-10-PCS | Performed by: EMERGENCY MEDICINE

## 2020-01-01 PROCEDURE — 71045 X-RAY EXAM CHEST 1 VIEW: CPT

## 2020-01-01 PROCEDURE — 74011250637 HC RX REV CODE- 250/637: Performed by: INTERNAL MEDICINE

## 2020-01-01 PROCEDURE — 99152 MOD SED SAME PHYS/QHP 5/>YRS: CPT | Performed by: INTERNAL MEDICINE

## 2020-01-01 DEVICE — XIENCE SIERRA™ EVEROLIMUS ELUTING CORONARY STENT SYSTEM 2.50 MM X 33 MM / RAPID-EXCHANGE
Type: IMPLANTABLE DEVICE | Status: FUNCTIONAL
Brand: XIENCE SIERRA™

## 2020-01-01 DEVICE — XIENCE SIERRA™ EVEROLIMUS ELUTING CORONARY STENT SYSTEM 2.50 MM X 08 MM / RAPID-EXCHANGE
Type: IMPLANTABLE DEVICE | Status: FUNCTIONAL
Brand: XIENCE SIERRA™

## 2020-01-01 RX ORDER — ROCURONIUM BROMIDE 10 MG/ML
INJECTION, SOLUTION INTRAVENOUS
Status: COMPLETED | OUTPATIENT
Start: 2020-01-01 | End: 2020-01-01

## 2020-01-01 RX ORDER — DEXTROSE 50 % IN WATER (D50W) INTRAVENOUS SYRINGE
25 AS NEEDED
Status: DISCONTINUED | OUTPATIENT
Start: 2020-01-01 | End: 2020-01-10 | Stop reason: HOSPADM

## 2020-01-01 RX ORDER — EPINEPHRINE 0.1 MG/ML
INJECTION INTRACARDIAC; INTRAVENOUS
Status: COMPLETED | OUTPATIENT
Start: 2020-01-01 | End: 2020-01-01

## 2020-01-01 RX ORDER — FENTANYL CITRATE 50 UG/ML
INJECTION, SOLUTION INTRAMUSCULAR; INTRAVENOUS AS NEEDED
Status: DISCONTINUED | OUTPATIENT
Start: 2020-01-01 | End: 2020-01-06 | Stop reason: HOSPADM

## 2020-01-01 RX ORDER — HEPARIN SODIUM 1000 [USP'U]/ML
INJECTION, SOLUTION INTRAVENOUS; SUBCUTANEOUS AS NEEDED
Status: DISCONTINUED | OUTPATIENT
Start: 2020-01-01 | End: 2020-01-06 | Stop reason: HOSPADM

## 2020-01-01 RX ORDER — EPHEDRINE SULFATE/0.9% NACL/PF 50 MG/5 ML
25 SYRINGE (ML) INTRAVENOUS ONCE
Status: DISCONTINUED | OUTPATIENT
Start: 2020-01-01 | End: 2020-01-06 | Stop reason: HOSPADM

## 2020-01-01 RX ORDER — MIDAZOLAM HYDROCHLORIDE 1 MG/ML
INJECTION, SOLUTION INTRAMUSCULAR; INTRAVENOUS AS NEEDED
Status: DISCONTINUED | OUTPATIENT
Start: 2020-01-01 | End: 2020-01-06 | Stop reason: HOSPADM

## 2020-01-01 RX ORDER — PROPOFOL 10 MG/ML
0-50 VIAL (ML) INTRAVENOUS
Status: DISCONTINUED | OUTPATIENT
Start: 2020-01-01 | End: 2020-01-10 | Stop reason: HOSPADM

## 2020-01-01 RX ORDER — AMIODARONE HYDROCHLORIDE 150 MG/3ML
INJECTION, SOLUTION INTRAVENOUS
Status: COMPLETED | OUTPATIENT
Start: 2020-01-01 | End: 2020-01-01

## 2020-01-01 RX ORDER — ASPIRIN 300 MG/1
SUPPOSITORY RECTAL AS NEEDED
Status: DISCONTINUED | OUTPATIENT
Start: 2020-01-01 | End: 2020-01-06 | Stop reason: HOSPADM

## 2020-01-01 RX ORDER — NALOXONE HYDROCHLORIDE 0.4 MG/ML
INJECTION, SOLUTION INTRAMUSCULAR; INTRAVENOUS; SUBCUTANEOUS
Status: COMPLETED | OUTPATIENT
Start: 2020-01-01 | End: 2020-01-01

## 2020-01-01 RX ORDER — ASPIRIN 300 MG/1
300 SUPPOSITORY RECTAL ONCE
Status: DISCONTINUED | OUTPATIENT
Start: 2020-01-01 | End: 2020-01-06 | Stop reason: HOSPADM

## 2020-01-01 RX ORDER — ETOMIDATE 2 MG/ML
INJECTION INTRAVENOUS
Status: COMPLETED | OUTPATIENT
Start: 2020-01-01 | End: 2020-01-01

## 2020-01-01 RX ORDER — PROPOFOL 10 MG/ML
INJECTION, EMULSION INTRAVENOUS
Status: COMPLETED
Start: 2020-01-01 | End: 2020-01-01

## 2020-01-01 RX ORDER — SODIUM BICARBONATE 1 MEQ/ML
50 SYRINGE (ML) INTRAVENOUS
Status: DISCONTINUED | OUTPATIENT
Start: 2020-01-01 | End: 2020-01-10 | Stop reason: HOSPADM

## 2020-01-01 RX ORDER — MIDAZOLAM HYDROCHLORIDE 1 MG/ML
INJECTION, SOLUTION INTRAMUSCULAR; INTRAVENOUS
Status: COMPLETED
Start: 2020-01-01 | End: 2020-01-01

## 2020-01-01 RX ORDER — HEPARIN SODIUM 5000 [USP'U]/ML
4000 INJECTION, SOLUTION INTRAVENOUS; SUBCUTANEOUS
Status: COMPLETED | OUTPATIENT
Start: 2020-01-01 | End: 2020-01-01

## 2020-01-01 RX ORDER — HEPARIN SODIUM 5000 [USP'U]/ML
5000 INJECTION, SOLUTION INTRAVENOUS; SUBCUTANEOUS
Status: DISCONTINUED | OUTPATIENT
Start: 2020-01-01 | End: 2020-01-01 | Stop reason: ALTCHOICE

## 2020-01-01 RX ORDER — MAGNESIUM SULFATE HEPTAHYDRATE 40 MG/ML
INJECTION, SOLUTION INTRAVENOUS
Status: COMPLETED | OUTPATIENT
Start: 2020-01-01 | End: 2020-01-01

## 2020-01-01 RX ORDER — LIDOCAINE HYDROCHLORIDE 20 MG/ML
.5-2 INJECTION, SOLUTION EPIDURAL; INFILTRATION; INTRACAUDAL; PERINEURAL ONCE
Status: DISCONTINUED | OUTPATIENT
Start: 2020-01-01 | End: 2020-01-06 | Stop reason: HOSPADM

## 2020-01-01 RX ORDER — BIVALIRUDIN 250 MG/5ML
INJECTION, POWDER, LYOPHILIZED, FOR SOLUTION INTRAVENOUS AS NEEDED
Status: DISCONTINUED | OUTPATIENT
Start: 2020-01-01 | End: 2020-01-06 | Stop reason: HOSPADM

## 2020-01-01 RX ORDER — EPINEPHRINE 0.1 MG/ML
1 INJECTION INTRACARDIAC; INTRAVENOUS
Status: DISCONTINUED | OUTPATIENT
Start: 2020-01-01 | End: 2020-01-10 | Stop reason: HOSPADM

## 2020-01-01 RX ORDER — MIDAZOLAM HYDROCHLORIDE 1 MG/ML
4 INJECTION, SOLUTION INTRAMUSCULAR; INTRAVENOUS ONCE
Status: COMPLETED | OUTPATIENT
Start: 2020-01-01 | End: 2020-01-01

## 2020-01-01 RX ORDER — LIDOCAINE HYDROCHLORIDE 10 MG/ML
INJECTION, SOLUTION EPIDURAL; INFILTRATION; INTRACAUDAL; PERINEURAL AS NEEDED
Status: DISCONTINUED | OUTPATIENT
Start: 2020-01-01 | End: 2020-01-06 | Stop reason: HOSPADM

## 2020-01-01 RX ORDER — SODIUM BICARBONATE 1 MEQ/ML
SYRINGE (ML) INTRAVENOUS
Status: COMPLETED | OUTPATIENT
Start: 2020-01-01 | End: 2020-01-01

## 2020-01-01 RX ORDER — MIDAZOLAM HYDROCHLORIDE 1 MG/ML
2 INJECTION, SOLUTION INTRAMUSCULAR; INTRAVENOUS ONCE
Status: COMPLETED | OUTPATIENT
Start: 2020-01-01 | End: 2020-01-01

## 2020-01-01 RX ADMIN — MIDAZOLAM 4 MG: 1 INJECTION INTRAMUSCULAR; INTRAVENOUS at 19:29

## 2020-01-01 RX ADMIN — Medication 1 MG: at 18:40

## 2020-01-01 RX ADMIN — Medication 1 MG: at 18:49

## 2020-01-01 RX ADMIN — Medication 1 MG: at 21:15

## 2020-01-01 RX ADMIN — ROCURONIUM BROMIDE 70 MG: 10 INJECTION, SOLUTION INTRAVENOUS at 18:40

## 2020-01-01 RX ADMIN — Medication 5 MCG/KG/MIN: at 19:40

## 2020-01-01 RX ADMIN — Medication 50 MEQ: at 21:36

## 2020-01-01 RX ADMIN — PROPOFOL 5 MCG/KG/MIN: 10 INJECTION, EMULSION INTRAVENOUS at 19:40

## 2020-01-01 RX ADMIN — AMIODARONE HYDROCHLORIDE 150 MG: 50 INJECTION, SOLUTION INTRAVENOUS at 18:48

## 2020-01-01 RX ADMIN — HEPARIN SODIUM 4000 UNITS: 5000 INJECTION INTRAVENOUS; SUBCUTANEOUS at 19:28

## 2020-01-01 RX ADMIN — DEXTROSE MONOHYDRATE 50 G: 25 INJECTION, SOLUTION INTRAVENOUS at 22:03

## 2020-01-01 RX ADMIN — SODIUM CHLORIDE 1000 ML: 900 INJECTION, SOLUTION INTRAVENOUS at 18:40

## 2020-01-01 RX ADMIN — Medication 50 MEQ: at 18:42

## 2020-01-01 RX ADMIN — MAGNESIUM SULFATE 1 G: 2 INJECTION INTRAVENOUS at 18:41

## 2020-01-01 RX ADMIN — Medication 50 MEQ: at 21:41

## 2020-01-01 RX ADMIN — Medication 1 MG: at 18:38

## 2020-01-01 RX ADMIN — MIDAZOLAM 2 MG: 1 INJECTION INTRAMUSCULAR; INTRAVENOUS at 19:46

## 2020-01-01 RX ADMIN — AMIODARONE HYDROCHLORIDE 300 MG: 50 INJECTION, SOLUTION INTRAVENOUS at 21:22

## 2020-01-01 RX ADMIN — Medication 1 MG: at 21:25

## 2020-01-01 RX ADMIN — ETOMIDATE 20 MG: 2 INJECTION, SOLUTION INTRAVENOUS at 18:40

## 2020-01-01 RX ADMIN — NALOXONE HYDROCHLORIDE 2 MG: 0.4 INJECTION, SOLUTION INTRAMUSCULAR; INTRAVENOUS; SUBCUTANEOUS at 18:43

## 2020-01-01 RX ADMIN — Medication 1 MG: at 21:11

## 2020-01-01 RX ADMIN — EPINEPHRINE 1 MCG/MIN: 1 INJECTION INTRAMUSCULAR; INTRAVENOUS; SUBCUTANEOUS at 19:36

## 2020-01-01 RX ADMIN — MIDAZOLAM HYDROCHLORIDE 2 MG: 1 INJECTION, SOLUTION INTRAMUSCULAR; INTRAVENOUS at 19:46

## 2020-01-05 PROBLEM — I21.3 STEMI (ST ELEVATION MYOCARDIAL INFARCTION) (HCC): Status: ACTIVE | Noted: 2020-01-01

## 2020-01-05 NOTE — Clinical Note
Right groin prepped with ChloraPrep and draped. Wet prep solution applied at: 2001. Wet prep solution dried at: 2004. Wet prep elapsed drying time: 3 mins.

## 2020-01-05 NOTE — Clinical Note
Balloon inflated using multiple inflations inflation technique. Pressure = 12 veronika; Duration = 30 sec. Inflation 2: Pressure: 12 veronika; Duration: 28 sec.

## 2020-01-05 NOTE — Clinical Note
Contrast Dose Calculator:   Patient's age: 77.   Patient's sex: Male. Patient weight (kg) = 88. Creatinine level (mg/dL) = 1.1. Creatinine clearance (mL/min): 81.   Contrast concentration (mg/mL) = 300. MACD = 300 mL. Max Contrast dose per Creatinine Cl calculator = 182.25 mL.

## 2020-01-05 NOTE — Clinical Note
Lesion: Located in the Proximal LAD. Stent deployed. Single technique used. First inflation pressure = 14 veronika; inflation time: 33 sec.

## 2020-01-05 NOTE — Clinical Note
Lesion: Located in the Proximal LAD. Stent deployed. Single technique used. First inflation pressure = 14 veronika; inflation time: 40 sec.

## 2020-01-05 NOTE — Clinical Note
Lesion located in the Proximal LAD. Balloon inflated using multiple inflations inflation technique. Pressure = 14 veronika; Duration = 30 sec. Inflation 2: Pressure: 12 veronika; Duration: 32 sec.

## 2020-01-05 NOTE — ED PROVIDER NOTES
EMERGENCY DEPARTMENT HISTORY AND PHYSICAL EXAM    6:28 PM      Date: 1/5/2020  Patient Name: Ish Aranda    History of Presenting Illness     Chief Complaint   Patient presents with    Unresponsive         History Provided By: Patient's Daughter    Additional History (Context): Ish Aranda is a 77 y.o. male with hypertension and myocardial infarction who presents to the ED for chest pain. HPI is limited as patient became diaphoretic and then syncopized when arrived, and into cardiac arrest.  Patient with agonal breathing. Patient's daughter arrived to the ED later. She states that patient had complained of some intermittent chest pain today and took a few nitroglycerin. She states the pain became constant when he came to the ED. PCP: Marietta Mulligan MD        Past History     Past Medical History:  Past Medical History:   Diagnosis Date    CAD (coronary artery disease)     Coronary atherosclerosis of native coronary artery     Essential hypertension 3/8/2019    Myocardial infarction ([facility]     Other and unspecified angina pectoris     improved    Other and unspecified hyperlipidemia     Postsurgical percutaneous transluminal coronary angioplasty status     Thyroid disease        Past Surgical History:  Past Surgical History:   Procedure Laterality Date    HX ADENOIDECTOMY      HX CORONARY STENT PLACEMENT      HX HEART CATHETERIZATION      HX TONSILLECTOMY         Family History:  Family History   Problem Relation Age of Onset    Heart Disease Father         CAD    Coronary Artery Disease Father     Heart Attack Father 54    Heart Surgery Brother 62    Stroke Neg Hx        Social History:  Social History     Tobacco Use    Smoking status: Never Smoker    Smokeless tobacco: Never Used    Tobacco comment: extensive exposure to second hand smoke   Substance Use Topics    Alcohol use:  Yes     Alcohol/week: 4.0 - 5.0 standard drinks     Types: 3 - 4 Standard drinks or equivalent, 1 Cans of beer per week     Comment: occasional     Drug use: No       Allergies:  No Known Allergies      Review of Systems       Review of Systems   Unable to perform ROS: Acuity of condition         Physical Exam     Visit Vitals  /54   Pulse 95   Resp 16   Wt 88.5 kg (195 lb)   SpO2 100%   BMI 30.54 kg/m²         Physical Exam  Vitals signs and nursing note reviewed. Constitutional:       General: He is in acute distress. Appearance: He is well-developed. He is ill-appearing and diaphoretic. HENT:      Head: Normocephalic and atraumatic. Eyes:      General: No scleral icterus. Conjunctiva/sclera: Conjunctivae normal.   Neck:      Musculoskeletal: Neck supple. Cardiovascular:      Comments: On arrival is pulseless, CPR in progress from the ED registration area  Pulmonary:      Comments: Equal lung sounds on auscultation while being bagged  No rales auscultated  Abdominal:      General: Bowel sounds are normal. There is no distension. Palpations: Abdomen is soft. Musculoskeletal:         General: No swelling. Right lower leg: No edema. Left lower leg: No edema. Skin:     General: Skin is warm. Coloration: Skin is pale. Skin is not jaundiced. Findings: No rash.       Comments: Diaphoretic   Neurological:      Comments: Comatose, and in cardiac arrest  No hyperreflexia, no mild clonus  Pupils small at 2 mm both, reactive             Diagnostic Study Results     Labs -  Recent Results (from the past 12 hour(s))   GLUCOSE, POC    Collection Time: 01/05/20  6:37 PM   Result Value Ref Range    Glucose (POC) 108 70 - 110 mg/dL   CBC WITH AUTOMATED DIFF    Collection Time: 01/05/20  6:46 PM   Result Value Ref Range    WBC 19.0 (H) 4.6 - 13.2 K/uL    RBC 5.19 4.70 - 5.50 M/uL    HGB 16.1 (H) 13.0 - 16.0 g/dL    HCT 46.1 36.0 - 48.0 %    MCV 88.8 74.0 - 97.0 FL    MCH 31.0 24.0 - 34.0 PG    MCHC 34.9 31.0 - 37.0 g/dL    RDW 13.0 11.6 - 14.5 %    PLATELET 110 852 - 208 K/uL MPV 10.3 9.2 - 11.8 FL    NEUTROPHILS 20 (L) 40 - 73 %    LYMPHOCYTES 72 (H) 21 - 52 %    MONOCYTES 6 3 - 10 %    EOSINOPHILS 2 0 - 5 %    BASOPHILS 0 0 - 2 %    ABS. NEUTROPHILS 3.8 1.8 - 8.0 K/UL    ABS. LYMPHOCYTES 13.6 (H) 0.9 - 3.6 K/UL    ABS. MONOCYTES 1.2 0.05 - 1.2 K/UL    ABS. EOSINOPHILS 0.3 0.0 - 0.4 K/UL    ABS. BASOPHILS 0.0 0.0 - 0.1 K/UL    DF AUTOMATED     METABOLIC PANEL, COMPREHENSIVE    Collection Time: 01/05/20  6:46 PM   Result Value Ref Range    Sodium 145 136 - 145 mmol/L    Potassium 4.4 3.5 - 5.5 mmol/L    Chloride 112 (H) 100 - 111 mmol/L    CO2 27 21 - 32 mmol/L    Anion gap 6 3.0 - 18 mmol/L    Glucose 143 (H) 74 - 99 mg/dL    BUN 19 (H) 7.0 - 18 MG/DL    Creatinine 1.20 0.6 - 1.3 MG/DL    BUN/Creatinine ratio 16 12 - 20      GFR est AA >60 >60 ml/min/1.73m2    GFR est non-AA >60 >60 ml/min/1.73m2    Calcium 8.7 8.5 - 10.1 MG/DL    Bilirubin, total 0.6 0.2 - 1.0 MG/DL    ALT (SGPT) 119 (H) 16 - 61 U/L    AST (SGOT) 115 (H) 10 - 38 U/L    Alk.  phosphatase 81 45 - 117 U/L    Protein, total 7.1 6.4 - 8.2 g/dL    Albumin 3.5 3.4 - 5.0 g/dL    Globulin 3.6 2.0 - 4.0 g/dL    A-G Ratio 1.0 0.8 - 1.7     CARDIAC PANEL,(CK, CKMB & TROPONIN)    Collection Time: 01/05/20  6:46 PM   Result Value Ref Range     39 - 308 U/L    CK - MB 13.7 (H) <3.6 ng/ml    CK-MB Index 6.7 (H) 0.0 - 4.0 %    Troponin-I, QT 1.20 (H) 0.0 - 0.045 NG/ML   TSH 3RD GENERATION    Collection Time: 01/05/20  6:46 PM   Result Value Ref Range    TSH 11.40 (H) 0.36 - 3.74 uIU/mL   PTT    Collection Time: 01/05/20  6:46 PM   Result Value Ref Range    aPTT 28.6 23.0 - 36.4 SEC   PROTHROMBIN TIME + INR    Collection Time: 01/05/20  6:46 PM   Result Value Ref Range    Prothrombin time 14.2 11.5 - 15.2 sec    INR 1.1 0.8 - 1.2     POC TROPONIN-I    Collection Time: 01/05/20  6:48 PM   Result Value Ref Range    Troponin-I (POC) 0.55 (HH) 0.00 - 0.08 ng/mL   POC CHEM8    Collection Time: 01/05/20  6:49 PM   Result Value Ref Range    CO2, POC 24 19 - 24 MMOL/L    Glucose,  (H) 74 - 106 MG/DL    BUN, POC 18 7 - 18 MG/DL    Creatinine, POC 1.1 0.6 - 1.3 MG/DL    GFRAA, POC >60 >60 ml/min/1.73m2    GFRNA, POC >60 >60 ml/min/1.73m2    Sodium,  136 - 145 MMOL/L    Potassium, POC 5.0 3.5 - 5.5 MMOL/L    Calcium, ionized (POC) 1.20 1. 12 - 1.32 mmol/L    Chloride,  100 - 108 MMOL/L    Anion gap, POC 17 10 - 20      Hematocrit, POC 46 36 - 49 %    Hemoglobin, POC 15.6 12 - 16 G/DL   EKG, 12 LEAD, INITIAL    Collection Time: 01/05/20  6:54 PM   Result Value Ref Range    Ventricular Rate 122 BPM    Atrial Rate 120 BPM    QRS Duration 152 ms    Q-T Interval 318 ms    QTC Calculation (Bezet) 453 ms    Calculated R Axis -31 degrees    Calculated T Axis 96 degrees    Diagnosis       Atrial fibrillation with rapid ventricular response  Left axis deviation  Nonspecific intraventricular block  Inferior infarct , age undetermined  Abnormal ECG  When compared with ECG of 21-JUN-2014 06:15,  Atrial fibrillation has replaced Sinus rhythm  Vent. rate has increased BY  54 BPM  Questionable change in QRS duration  Inferior infarct is now present     POC VENOUS BLOOD GAS    Collection Time: 01/05/20  7:00 PM   Result Value Ref Range    Device: AMBU      Flow rate (POC) 15 L/M    FIO2 (POC) 100 %    pH, venous (POC) 7.176 (LL) 7.32 - 7.42      pCO2, venous (POC) 64.3 (H) 41 - 51 MMHG    pO2, venous (POC) 26 25 - 40 mmHg    HCO3, venous (POC) 23.8 23.0 - 28.0 MMOL/L    sO2, venous (POC) 32 (L) 65 - 88 %    Base deficit, venous (POC) 6 mmol/L    Allens test (POC) YES      Total resp.  rate 20      Site RIGHT FEMORAL      Specimen type (POC) VENOUS BLOOD      Performed by Luis Rico    POC G3    Collection Time: 01/05/20  7:29 PM   Result Value Ref Range    Device: VENT      FIO2 (POC) 100 %    pH (POC) 7.315 (L) 7.35 - 7.45      pCO2 (POC) 41.6 35.0 - 45.0 MMHG    pO2 (POC) 427 (H) 80 - 100 MMHG    HCO3 (POC) 21.2 (L) 22 - 26 MMOL/L    sO2 (POC) 100 (H) 92 - 97 %    Base deficit (POC) 5 mmol/L    Mode ASSIST CONTROL      Tidal volume 500 ml    Set Rate 16 bpm    PEEP/CPAP (POC) 8 cmH2O    PIP (POC) 22      Allens test (POC) YES      Inspiratory Time 1 sec    Total resp. rate 16      Site LEFT RADIAL      Specimen type (POC) ARTERIAL      Performed by Varinder Rasheed     Volume control plus YES         Radiologic Studies -   XR CHEST PORT    (Results Pending)   Per my preliminary read: No pneumothorax, no acute process, ET tube in good placement  Lola Seal DO      Medical Decision Making   I am the first provider for this patient. I reviewed the vital signs, available nursing notes, past medical history, past surgical history, family history and social history. Vital Signs-Reviewed the patient's vital signs. EKG: Interpreted by the EP Dr. Michael Greene.       Rate: 122   Rhythm: Questionable A. fib   Interpretation: Normal QTC, prolonged QRS duration, ST elevations in the anterior lead, STEMI   Comparison: EKG 3/8/2019 shows no ST elevations, and was normal sinus rhythm    Records Reviewed: Nursing Notes and Old Medical Records (Time of Review: 6:57 PM)    Provider Notes (Medical Decision Making): Patient with history of MI, went into cardiac arrest when arrived to ED    Immediately started CPR in the waiting room and brought patient back to room 2    No pulse, started ACLS protocol  Severe agonal breathing, diaphoretic, small pupils    MDM    Medications   EPINEPHrine (ADRENALIN) 4 mg in 0.9% sodium chloride 250 mL infusion (1 mcg/min IntraVENous New Bag 1/5/20 1936)   propofol (DIPRIVAN) 10 mg/mL infusion (5 mcg/kg/min × 88.5 kg IntraVENous New Bag 1/5/20 1940)   EPINEPHrine (ADRENALIN) 0.1 mg/mL syringe (1 mg IntraVENous Given 1/5/20 1849)   magnesium sulfate 2 g/50 ml IVPB (premix or compounded) (1 g IntraVENous New Bag 1/5/20 1841)   sodium bicarbonate 8.4 % (1 mEq/mL) injection (50 mEq IntraVENous Given 1/5/20 1842)   naloxone (Yuli Belts) injection (2 mg IntraVENous Given 1/5/20 1843)   amiodarone (CORDARONE) injection (150 mg IntraVENous Given 1/5/20 1848)   sodium chloride 0.9 % bolus infusion (1,000 mL IntraVENous New Bag 1/5/20 1840)   etomidate (AMIDATE) 2 mg/mL injection (20 mg IntraVENous Given 1/5/20 1840)   rocuronium injection (70 mg IntraVENous Given 1/5/20 1840)   midazolam (VERSED) injection 4 mg (4 mg IntraVENous Given 1/5/20 1929)   heparin (porcine) injection 4,000 Units (4,000 Units IntraVENous Given 1/5/20 1928)   midazolam (VERSED) injection 2 mg (2 mg IntraVENous Given 1/5/20 1946)       Procedures: Intubation  Date/Time: 1/5/2020 7:55 PM  Performed by: Alan Branch DO  Authorized by: Alan Branch DO     Consent:     Consent obtained:  Emergent situation  Pre-procedure details:     Patient status:  Unresponsive  Procedure details:     Preoxygenation:  Bag valve mask    CPR in progress: yes      Intubation method:  Oral    Oral intubation technique:  Video-assisted    Laryngoscope blade: Mac 3    Tube size (mm):  7.5    Number of attempts:  2    Ventilation between attempts: yes      Cricoid pressure: no      Tube visualized through cords: yes    Placement assessment:     ETT to lip:  24    Tube secured with:  ETT armando    Breath sounds:  Equal    Placement verification: chest rise, condensation, CXR verification, direct visualization, equal breath sounds and ETCO2 detector      CXR findings:  ETT in proper place  Post-procedure details:     Patient tolerance of procedure: Tolerated well, no immediate complications            ED Course: Progress Notes, Reevaluation, and Consults:  Emergently intubated patient    Multiple rounds of epi given, also gave bicarb, magnesium. Narcan was also given in light of small pupils. No response from Narcan.     Vfib seen on a monitor, patient was shocked 1 time , gave amiodarone    Received ROSC at 6:51 PM    EKG shows acute anterior MI  Initial troponin 0.6  4000 unit bolus heparin given in ED    Dr. Jordyn Myrick has assisted me by placing a left femoral central line emergently, nonsterile. Please check as this may be arterial    Delay in STEMI activation due to cardiac arrest and patient stabilization, intubation    Consult:  Discussed care with Dr. Denise Serrano, Specialty: Interventional cardiologist, standard discussion; including history of patients chief complaint, available diagnostic results, and treatment course. He accepts consult, coming in to get patient to Cath Lab  7:04 PM, 1/5/2020     Cath Lab called and states wait until Dr. Denise Serrano gets here and evaluate patient before sending him to the Cath Lab. Patient was bucking at the vent and biting at the vent, gave him a dose of Versed. Patient on propofol drip, and epi drip.     7:31 PM  Dr. Denise Serrano here to evaluate patient    Cath Lab called and requested that we bring patient up to the Cath Lab    Patient began biting at the tube again and tried to sit up, gave him another dose of Versed    7:50 PM patient heading up to Cath Lab      Critical care time:   I have spent 52 minutes of critical care time involved in lab review, consultations with specialist, family decision making, and documentation. During this entire length of time I was immediately available to the patient. Critical care: The reason for providing this level of medical care for this critically ill patient was due to a critical illness that impaired one or more vital organ systems such that there was a high probability of imminent or life threatening deterioration in the patients condition. This care involved high complexity decision making to assess, manipulate and support vital system functions, to treat this degree vital organ system failure and to prevent further life threatening deterioration of the patient's condition.      Consult:  Discussed care with Dr. Perez Members, Specialty: Hospitalist, standard discussion; including history of patients chief complaint, available diagnostic results, and treatment course. Before admission to her she would like the ICU intensivist contacted first to determine if patient should go to the ICU    Consult:  Discussed care with Dr. Gracie Cordero, Specialty: ICU intensivist, standard discussion; including history of patients chief complaint, available diagnostic results, and treatment course. He wants to know if there was actual cardiac intervention done to patient such as balloon  or stent because if so patient is to go to CVT ICU admitted to the hospitalist, and he will be consulted at that point. Otherwise if no such intervention done likely will need of medical ICU admission to him. Request me to call him back after determine that. I contacted the Cath Lab. Tech there is states that she is already put in bed placement order for CVT ICU. She states patient is receiving stent and balloon. I will consult ICU intensivist to see patient in the CVT ICU. I will consult hospitalist for admission    Dr. Keke Jeffries states either Dr. Laureen Weems or Dr. Radha Medina should be the primary doctor admitting the patient    Dr. Gracie Cordero, ICU intensivist called me back. He states he is going make some phone calls and check with CVT ICU, and will determine who will admit patient and call me back with plan. Dr. Radha Medina called me back states that patient should go to hospitalist not him for admission and he will indeed consult    Further discussed with Dr. Keke Jeffries and she states it was determined that patient will go to Dr. Laureen Weems, cardiologist as the admitting physician    Diagnosis     Clinical Impression:   1. Acute MI, anterior wall ([address]    2. Cardiac arrest ([address]    3. Ventricular fibrillation ([address]    4. Ventilator dependent ([address]    5. ST elevation (STEMI) myocardial infarction [facility])        Disposition: Admitted    Follow-up Information    None              DO Angelica Wheeler medical dictation software was used for portions of this report.    Unintended transcription errors may occur. My signature above authenticates this document and my orders, the final    diagnosis (es), discharge prescription (s), and instructions in the Epic    record.         .Stephani Jimenez

## 2020-01-05 NOTE — Clinical Note
Called ER for patient to be brought at 1926 ; called back at 1932 and was told patient had left ; called back at 743 to see what hold up was.

## 2020-01-05 NOTE — Clinical Note
ASA was not confirmed whether it was given at home or not ;  Asked physician if he wanted NG tubed placed or ASA suppository ; unable to get clear answer

## 2020-01-05 NOTE — Clinical Note
TRANSFER - IN REPORT:     Verbal report received from: GARRET Velez. Report consisted of patient's Situation, Background, Assessment and   Recommendations(SBAR). Opportunity for questions and clarification was provided. Assessment completed upon patient's arrival to unit and care assumed. Patient transported with a Registered Nurse, Monitor and Oxygen.  Patient intubated

## 2020-01-06 PROBLEM — R57.0 CARDIOGENIC SHOCK (HCC): Status: ACTIVE | Noted: 2020-01-06

## 2020-01-06 LAB
ATRIAL RATE: 120 BPM
ATRIAL RATE: 141 BPM
CALCULATED R AXIS, ECG10: -31 DEGREES
CALCULATED R AXIS, ECG10: -33 DEGREES
CALCULATED T AXIS, ECG11: 134 DEGREES
CALCULATED T AXIS, ECG11: 96 DEGREES
DIAGNOSIS, 93000: NORMAL
DIAGNOSIS, 93000: NORMAL
Q-T INTERVAL, ECG07: 318 MS
Q-T INTERVAL, ECG07: 320 MS
QRS DURATION, ECG06: 152 MS
QRS DURATION, ECG06: 162 MS
QTC CALCULATION (BEZET), ECG08: 453 MS
QTC CALCULATION (BEZET), ECG08: 461 MS
VENTRICULAR RATE, ECG03: 122 BPM
VENTRICULAR RATE, ECG03: 125 BPM

## 2020-01-06 RX ORDER — ATROPINE SULFATE 0.1 MG/ML
INJECTION INTRAVENOUS AS NEEDED
Status: DISCONTINUED | OUTPATIENT
Start: 2020-01-01 | End: 2020-01-06 | Stop reason: HOSPADM

## 2020-01-06 RX ORDER — DOPAMINE HYDROCHLORIDE 160 MG/100ML
INJECTION, SOLUTION INTRAVENOUS
Status: COMPLETED | OUTPATIENT
Start: 2020-01-06 | End: 2020-01-01

## 2020-01-06 RX ORDER — EPHEDRINE SULFATE/0.9% NACL/PF 50 MG/5 ML
SYRINGE (ML) INTRAVENOUS AS NEEDED
Status: DISCONTINUED | OUTPATIENT
Start: 2020-01-01 | End: 2020-01-06 | Stop reason: HOSPADM

## 2020-01-06 RX ORDER — VASOPRESSIN 20 U/ML
INJECTION PARENTERAL AS NEEDED
Status: DISCONTINUED | OUTPATIENT
Start: 2020-01-01 | End: 2020-01-06 | Stop reason: HOSPADM

## 2020-01-06 RX ORDER — MAGNESIUM SULFATE HEPTAHYDRATE 500 MG/ML
INJECTION, SOLUTION INTRAMUSCULAR; INTRAVENOUS AS NEEDED
Status: DISCONTINUED | OUTPATIENT
Start: 2020-01-01 | End: 2020-01-06 | Stop reason: HOSPADM

## 2020-01-06 NOTE — PROGRESS NOTES
responded to Code for  [employer], who is a 77 y.o.,male,     The  provided the following Interventions:  Provided crisis pastoral care and pastoral support. Offered prayers on behalf for the patient. Chart reviewed. The following outcomes were achieved:  Patient was successfully resuscitated. Assessment:  There are no spiritual or [employer]al issues which require intervention at this time. Plan:  Chaplains will continue to follow and will provide pastoral care on an as needed/requested basis.  recommends bedside caregivers page  on duty if patient shows signs of acute spiritual or emotional distress.        [address] [facility].   [phone]

## 2020-01-06 NOTE — CONSULTS
CARDIOLOGY CONSULT late entry on 1/6/2020    Patient: Monie Eller  MR #: [id number]      Reason for consult: Cardiogenic shock, status post cardiac arrest with prolonged resuscitative effort    Assessment/Plan:     Hospital Problems  Date Reviewed: 4/15/2019          Codes Class Noted POA    STEMI (ST elevation myocardial infarction) ([facility] [address] acute extensive anterior wall myocardial infarction. Plan is for emergent cardiac catheterization and possible intervention. ICD-10-CM: I21.3  ICD-9-CM: 410.90  1/5/2020 Unknown        Cardiogenic shock ([facility] [address] systolic BP markedly depressed requiring pressor support prior to transfer to cardiac catheterization lab and delaying cardiac intervention ICD-10-CM: R57.0  ICD-9-CM: 785.51  1/6/2020 Unknown        History of coronary artery stent placement, prior stenting of the left anterior descending artery in the past.  Those records unavailable at the time of the patient's admission. RCA stenting in 2014. ICD-10-CM: Z95.5  ICD-9-CM: V45.82  4/15/2019 Yes        Essential hypertension ICD-10-CM: I10  ICD-9-CM: 401.9  3/8/2019 Yes        Coronary artery disease of native artery of native heart with stable angina pectoris ([facility] [address] ICD-10-CM: I25.118  ICD-9-CM: 414.01, 413.9  Unknown Yes    Overview Addendum 2/15/2013 12:15 PM by Brenton Zhou MD     .Stable symptoms             Hypercholesteremia ICD-10-CM: E78.00  ICD-9-CM: 272.0  Unknown Yes    Overview Addendum 7/3/2014 11:15 AM by Brenton Zhou MD     6/14 Low density lipoproteins (LDLs) are at goal, triglycerides are at goal, High density lipoproteins (HDLs) are at goal                   History of Present Illness  Monie Eller is a 77 y.o. man that presented to the [facility] ED in the evening of 1/5/2020. The patient was in full cardiac arrest shortly after his arrival.  No meaningful history was obtained from the patient.   According to the patient's daughter, he complained of some  intermittent chest pain on the day of admission. He was taking some sublingual nitroglycerin. It was later learned that the patient had  a respiratory type illness for several weeks. He had been coughing frequently. The history was extremely limited at the time of his presentation. In the record was that the patient had prior coronary intervention. He had stenting of the left anterior descending artery at some time in the distant past.  Those records were unavailable. More recently, he had stenting of the right coronary artery in 2014 or 2015. He was seen in 2019 and admitted to [facility] for a chest pain evaluation. A nuclear stress test was done at that time which  was remarkable for no significant ischemia. Past Medical History  Past Medical History:   Diagnosis Date    CAD (coronary artery disease)     Coronary atherosclerosis of native coronary artery     Essential hypertension 3/8/2019    Myocardial infarction ([facility] [address]     Other and unspecified angina pectoris     improved    Other and unspecified hyperlipidemia     Postsurgical percutaneous transluminal coronary angioplasty status     Thyroid disease        Past Surgical History  Social History     Socioeconomic History    Marital status:      Spouse name: Not on file    Number of children: Not on file    Years of education: Not on file    Highest education level: Not on file   Occupational History    Not on file   Social Needs    Financial resource strain: Not on file    Food insecurity:     Worry: Not on file     Inability: Not on file    Transportation needs:     Medical: Not on file     Non-medical: Not on file   Tobacco Use    Smoking status: Never Smoker    Smokeless tobacco: Never Used    Tobacco comment: extensive exposure to second hand smoke   Substance and Sexual Activity    Alcohol use: Yes     Alcohol/week: 4.0 - 5.0 standard drinks     Types: 3 - 4 Standard drinks or equivalent, 1 Cans of beer per week     Comment: occasional     Drug use:  No  Sexual activity: Not on file   Lifestyle    Physical activity:     Days per week: Not on file     Minutes per session: Not on file    Stress: Not on file   Relationships    Social connections:     Talks on phone: Not on file     Gets together: Not on file     Attends Islam service: Not on file     Active member of club or organization: Not on file     Attends meetings of clubs or organizations: Not on file     Relationship status: Not on file    Intimate partner violence:     Fear of current or ex partner: Not on file     Emotionally abused: Not on file     Physically abused: Not on file     Forced sexual activity: Not on file   Other Topics Concern    Not on file   Social History Narrative    Worked in the [employer] for 30 years, beginning in the [de-identified].         Pt with multiple dogs and 2 \"lovebirds\" at home         Meds  Current Facility-Administered Medications   Medication Dose Route Frequency    EPINEPHrine (ADRENALIN) 4 mg in 0.9% sodium chloride 250 mL infusion  1-10 mcg/min IntraVENous TITRATE    propofol (DIPRIVAN) 10 mg/mL infusion  0-50 mcg/kg/min IntraVENous TITRATE    EPINEPHrine (ADRENALIN) 0.1 mg/mL syringe 1 mg  1 mg IntraVENous Multiple    dextrose (D50W) injection syrg 25 g  25 g IntraVENous PRN    sodium bicarbonate 8.4 % (1 mEq/mL) injection 50 mEq  50 mEq IntraVENous Multiple    amiodarone (NEXTERONE) 360 mg in dextrose 200 mL (1.8 mg/mL) infusion  0.5-1 mg/min IntraVENous TITRATE         Allergies  No Known Allergies    Social History  Social History     Socioeconomic History    Marital status:      Spouse name: Not on file    Number of children: Not on file    Years of education: Not on file    Highest education level: Not on file   Occupational History    Not on file   Social Needs    Financial resource strain: Not on file    Food insecurity:     Worry: Not on file     Inability: Not on file    Transportation needs:     Medical: Not on file     Non-medical: Not on file   Tobacco Use    Smoking status: Never Smoker    Smokeless tobacco: Never Used    Tobacco comment: extensive exposure to second hand smoke   Substance and Sexual Activity    Alcohol use: Yes     Alcohol/week: 4.0 - 5.0 standard drinks     Types: 3 - 4 Standard drinks or equivalent, 1 Cans of beer per week     Comment: occasional     Drug use: No    Sexual activity: Not on file   Lifestyle    Physical activity:     Days per week: Not on file     Minutes per session: Not on file    Stress: Not on file   Relationships    Social connections:     Talks on phone: Not on file     Gets together: Not on file     Attends Congregation service: Not on file     Active member of club or organization: Not on file     Attends meetings of clubs or organizations: Not on file     Relationship status: Not on file    Intimate partner violence:     Fear of current or ex partner: Not on file     Emotionally abused: Not on file     Physically abused: Not on file     Forced sexual activity: Not on file   Other Topics Concern    Not on file   Social History Narrative    Worked in the [employer] for 30 years, beginning in the [de-identified]. Pt with multiple dogs and 2 \"lovebirds\" at home       Family History     Family History   Problem Relation Age of Onset    Heart Disease Father         CAD    Coronary Artery Disease Father     Heart Attack Father 54    Heart Surgery Brother 62    Stroke Neg Hx          Review of systems    Unobtainable      Physical Exam  Visit Vitals  /54   Pulse 95   Resp 16   Wt 88.5 kg (195 lb)   SpO2 100%   BMI 30.54 kg/m²       Cliff Allen is who is intubated and comatose. Lilian Troy Head is normocephalic and atraumatic. Trachea appears midline with no noted thyromegaly. Carotids are full without definite bruits. Chest has air entry bilaterally. Cardiac auscultation reveals regular rate and rhythm . Abdomen is soft. Extremities are without significant edema.  Dorsalis pedis and posterior tibial pulses are palpable. . Skin is warm and dry. Diagnostic Tests EKG: Atrial fibrillation with rapid ventricular response. Acute anterior wall myocardial infarction.   Labs:   Recent Labs     01/05/20  1846   WBC 19.0*   HGB 16.1*   HCT 46.1        Recent Labs     01/05/20  1846      K 4.4   *   CO2 27   *   BUN 19*   CREA 1.20   CA 8.7   ALB 3.5   SGOT 115*   *   INR 1.1       Recent Labs     01/05/20  1846   TROIQ 1.20*      CKMB 13.7*     Last Lipid:    Lab Results   Component Value Date/Time    Cholesterol, total 132 06/19/2014 04:40 AM    HDL Cholesterol 33 (L) 06/19/2014 04:40 AM    LDL, calculated 73.4 06/19/2014 04:40 AM    Triglyceride 128 06/19/2014 04:40 AM    CHOL/HDL Ratio 4.0 06/19/2014 04:40 AM               Ban Herring MD  1/6/2020

## 2020-01-06 NOTE — ED NOTES
Assisted Dr. Black Keep with a left femoral central line placed during CPR. It was placed blindly based on landmarks. Location is unknown please confirm this is venous. Also; this line was not placed using standard sterile precautions as pt was in arrest and access was needed. Remove line as soon as possible.      Enma Benton MD

## 2020-01-06 NOTE — PROGRESS NOTES
[facility]  Progress Note        PFM responded to Code Blue on the patient in the Cath Lab with senior Dr. Virgilio Bass. Cardiology was already running the code, so PFM stayed on standby. Patient successfully resuscitated. Cardiology continued with the the care of the patient. PFM signed off.      Louie Armas MD PGY-1   [facility] Medicine   Pager: [phone]   Date: 01/05/2020

## 2020-01-06 NOTE — ED TRIAGE NOTES
Pt arrived through ED lobby, told staff members that his chest hurt and went unresponsive. Pt placed on stretcher and brought to Rm #2. Code blue called.

## 2020-01-06 NOTE — PROGRESS NOTES
responded to Death of  Lajuanda Claude, who was a 77 y.o.,male,     The  provided the following Interventions:  Provided crisis pastoral care, pastoral support and grief interventions. Offered prayers on behalf of the patient. Chart reviewed. Plan:  Chaplains will continue to follow and will provide pastoral care on an as needed/requested basis and grief support for the family.       [address] [facility].   [phone]

## 2020-01-07 RX ORDER — HYDROMORPHONE HYDROCHLORIDE 2 MG/ML
INJECTION, SOLUTION INTRAMUSCULAR; INTRAVENOUS; SUBCUTANEOUS
Status: DISCONTINUED
Start: 2020-01-07 | End: 2020-01-07 | Stop reason: HOSPADM

## 2020-01-23 NOTE — DISCHARGE SUMMARY
DISCHARGE SUMMARY Admission Date: 1/5/2020 Discharge Date: 1/5/2020 REFERRING MD: Zbigniew Lawler. Yaniv Callaway MD 
Reason for Hospitalization: 
Kedar Lemons is a 77 y.o. male patient who was admitted on 1/5/2020 after experiencing cardiovascular collapse in the ED waiting room. . For details of the History and physical, I refer you to the admission note. Hospital Course: 
Patient presented to the [facility] ED on the evening of 1/5/2020. He complained of chest discomfort at the  of the ED waiting room and shortly thereafter experienced cardiovascular collapse loss of consciousness in the waiting room. Electrocardiogram demonstrated atrial fibrillation with the rapid ventricular response. There was ST elevation across the entire precordium. A code STEMI was called. Patient had required multiple rounds of epinephrine and antiarrhythmics. He was in cardiogenic shock at the time of his arrival to the cardiac catheterization laboratory. For details of the procedure, would refer to the catheterization report. Suffice it to say, despite all aggressive efforts, the patient developed PEA which was resistant to all therapy. The family was informed immediately. Last set of Labs:   
Lab Results Component Value Date/Time  01/05/2020 06:46 PM  
 K 4.4 01/05/2020 06:46 PM  
  (H) 01/05/2020 06:46 PM  
 CO2 27 01/05/2020 06:46 PM  
 BUN 19 (H) 01/05/2020 06:46 PM  
 HGB 16.1 (H) 01/05/2020 06:46 PM  
 HCT 46.1 01/05/2020 06:46 PM  
 WBC 19.0 (H) 01/05/2020 06:46 PM  
  01/05/2020 06:46 PM  
 CKMB 13.7 (H) 01/05/2020 06:46 PM  
 
 
Cardiographics: 1/5/2020. Atrial fibrillation with a rapid ventricular response. Acute anterior wall myocardial infarction. Imaging:  Chest X-Ray: Endotracheal tube in proper position No current facility-administered medications for this encounter. Current Outpatient Medications Medication Sig  
  metoprolol succinate (TOPROL-XL) 100 mg tablet Take 1 Tab by mouth daily.  atorvastatin (LIPITOR) 80 mg tablet Take 1 Tab by mouth daily.  amLODIPine (NORVASC) 5 mg tablet Take 1 Tab by mouth daily.  nitroglycerin (NITROSTAT) 0.4 mg SL tablet 1 Tab by SubLINGual route every five (5) minutes as needed for Chest Pain for up to 3 doses.  levothyroxine (SYNTHROID) 150 mcg tablet Take  by mouth Daily (before breakfast).  DOCOSAHEXANOIC ACID/EPA (FISH OIL PO) Take  by mouth.  aspirin 81 mg tablet Take 81 mg by mouth daily. Death diagnoses: 
Acute anterior wall myocardial infarction/cardiogenic shock CAD, status post prior stenting of the right coronary artery and left anterior descending artery Hypertension Dyslipidemia
